# Patient Record
Sex: MALE | Race: BLACK OR AFRICAN AMERICAN | NOT HISPANIC OR LATINO | Employment: UNEMPLOYED | URBAN - NONMETROPOLITAN AREA
[De-identification: names, ages, dates, MRNs, and addresses within clinical notes are randomized per-mention and may not be internally consistent; named-entity substitution may affect disease eponyms.]

---

## 2020-06-22 ENCOUNTER — HISTORICAL (OUTPATIENT)
Dept: ADMINISTRATIVE | Facility: HOSPITAL | Age: 35
End: 2020-06-22

## 2020-06-22 LAB
ALBUMIN SERPL BCP-MCNC: 4.1 G/DL (ref 3.5–5)
ALBUMIN/GLOB SERPL ELPH: 1.1 {RATIO} (ref 1.5–2.2)
ALCOHOL (ETHANOL), BLOOD: <3 MG/DL (ref 0–3)
ALP SERPL-CCNC: 47 U/L (ref 50–136)
ALT SERPL W P-5'-P-CCNC: 26 U/L (ref 16–61)
ANION GAP SERPL CALC-SCNC: 7.8 MEQ/L (ref 10–20)
APPEARANCE, UA: CLEAR
AST SERPL-CCNC: 17 U/L (ref 15–37)
BACTERIA SPEC CULT: NEGATIVE /HPF
BASOPHILS NFR BLD: 0 10 (ref 0–0.1)
BASOPHILS NFR BLD: 0.9 % (ref 0–1.5)
BILIRUB SERPL-MCNC: 0.61 MG/DL (ref 0.2–1)
BILIRUB UR QL STRIP: NEGATIVE MG/DL
BUDDING YEAST: NORMAL /HPF
BUN SERPL-MCNC: 10 MG/DL (ref 7–18)
CALCIUM SERPL-MCNC: 9.2 MG/DL (ref 8.5–10.1)
CASTS, URINE MICROSCOPIC: NEGATIVE /LPF
CHLORIDE SERPL-SCNC: 108 MMOL/L (ref 98–107)
CO2 SERPL-SCNC: 25 MMOL/L (ref 22–32)
COLOR UR: YELLOW
COVID-19 INTERNAL CONTROL: NORMAL
CREAT SERPL-MCNC: 0.97 MG/DL (ref 0.7–1.3)
EGFR: 115 ML/MIN/1.73M
EOSINOPHIL NFR BLD: 0 10 (ref 0–0.7)
EOSINOPHIL NFR BLD: 0.9 % (ref 0–7)
EPITHELIAL, URINE MICROSCOPIC: NEGATIVE /HPF
ERYTHROCYTE [DISTWIDTH] IN BLOOD BY AUTOMATED COUNT: 12.3 % (ref 11.5–14.5)
GLOBULIN: 3.8 G/DL (ref 2.3–3.5)
GLUCOSE (UA): NEGATIVE MG/DL
GLUCOSE SERPL-MCNC: 85 MG/DL (ref 70–99)
GRAN #: 2.63 10 (ref 2–7.5)
GRAN%: 0.2 %
GRAN%: 58.9 % (ref 50–80)
HCT VFR BLD AUTO: 41.5 % (ref 43.5–53.7)
HGB BLD-MCNC: 14.9 G/DL (ref 14.1–18.1)
HGB UR QL STRIP: NEGATIVE ERY/UL
IMMATURE GRANULOCYTES #: 0.01 10
KETONES UR QL STRIP: NEGATIVE MG/DL
LEUKOCYTE ESTERASE UR QL STRIP: NEGATIVE LEU/UL
LYMPH #: 1.4 10 (ref 1–3.5)
LYMPH%: 30.2 % (ref 12–50)
MCH RBC QN AUTO: 30.5 PG (ref 27–31)
MCHC RBC AUTO-ENTMCNC: 35.9 G% (ref 32–35)
MCV RBC AUTO: 85 FL (ref 80–97)
MONO #: 0.4 10 (ref 0–0.8)
MONO%: 8.9 % (ref 0–12)
NITRITE UR QL STRIP: NEGATIVE MG/DL
OSMOC: 272 MOSM/KG (ref 275–295)
PH UR STRIP: 6.5 [PH] (ref 5–7.5)
PMV BLD AUTO: 332 10 (ref 142–424)
PMV BLD AUTO: 9.3 FL (ref 7.4–10.4)
POTASSIUM SERPL-SCNC: 3.8 MMOL/L (ref 3.5–5.1)
PROT SERPL-MCNC: 7.9 G/DL (ref 6.4–8.2)
PROT UR QL STRIP: NEGATIVE MG/DL
RBC # BLD AUTO: 4.88 M/UL (ref 4.69–6.13)
RBC #/AREA URNS HPF: NEGATIVE /HPF
SARS-COV-2 RNA RESP QL NAA+PROBE: NOT DETECTED
SODIUM BLD-SCNC: 137 MMOL/L (ref 136–145)
SP GR UR STRIP: 1.01 (ref 1–1.03)
SPERM, URINE MICROSCOPIC: NORMAL /HPF
TYPE OF SPECIMEN  (UA): NORMAL
UNCLASSIFIED CRYSTALS, UA: NORMAL /HPF
UROBILINOGEN UR STRIP-ACNC: NORMAL EU/L
WBC # BLD AUTO: 4.5 10 (ref 4–10.2)
WBC #/AREA URNS HPF: NEGATIVE /HPF

## 2020-06-23 LAB
CHOLEST SERPL-MSCNC: 193 MG/DL (ref 0–200)
CHOLEST/HDLC SERPL: 4.7 {RATIO}
HDL/CHOLESTEROL RATIO: 41 MG/DL (ref 40–60)
LDLC SERPL CALC-MCNC: 136 MG/DL (ref 0–130)
RPR: NON REACTIVE
TRIGL SERPL-MCNC: 82 MG/ML (ref 30–150)
TSH SERPL DL<=0.005 MIU/L-ACNC: 1.88 UIU/ML (ref 0.36–3.74)

## 2020-11-04 ENCOUNTER — HOSPITAL ENCOUNTER (EMERGENCY)
Facility: HOSPITAL | Age: 35
Discharge: LEFT BEFORE DISCHARGE | End: 2020-11-04
Attending: EMERGENCY MEDICINE
Payer: MEDICAID

## 2020-11-04 VITALS
TEMPERATURE: 98 F | HEART RATE: 101 BPM | HEIGHT: 70 IN | DIASTOLIC BLOOD PRESSURE: 84 MMHG | WEIGHT: 226 LBS | OXYGEN SATURATION: 96 % | SYSTOLIC BLOOD PRESSURE: 138 MMHG | RESPIRATION RATE: 16 BRPM | BODY MASS INDEX: 32.35 KG/M2

## 2020-11-04 DIAGNOSIS — R10.9 ABDOMINAL PAIN: ICD-10-CM

## 2020-11-04 DIAGNOSIS — R10.33 PERIUMBILICAL ABDOMINAL PAIN: Primary | ICD-10-CM

## 2020-11-04 PROCEDURE — 99281 EMR DPT VST MAYX REQ PHY/QHP: CPT

## 2020-11-04 RX ORDER — OLANZAPINE 5 MG/1
20 TABLET ORAL ONCE
Status: DISCONTINUED | OUTPATIENT
Start: 2020-11-04 | End: 2020-11-05 | Stop reason: HOSPADM

## 2020-11-05 NOTE — ED NOTES
"NEUROLOGICAL:   Patient is awake , alert  and oriented x 4 . Pupils are PERRL. Gait is steady.   Moves all extremities without difficulty.   Patient reports no neuro complaints..  GCS 15    HEENT:   Head appears normocephalic  and symmetric .   Eyes appear WNL to both eyes. Patient reports no complaints  to both eyes .   Ears appear WNL. Patient reports no complaints  to both ears.   Nares appear patent . Patient reports no nose complaints .  Mouth appears moist, pink and teeth intact. Patient reports no mouth complaints.   Throat appears pink and moist . Patient reports no throat complaints.    CARDIOVASCULAR:   S1 and S2 present, no murmurs, gallops, or rubs, rate regular  and pulses palpable (2+)    On palpation no edema noted , noted to none.   Patient reports no CV complaints.  .   Patient vitals are WNL.    RESPIRATORY:   Airway Clear, Open, and Patent.  Respirations are even and unlabored.   Breath sounds clear  to all lung fields.   Patient reports no respiratory complaints.     GASTROINTESTINAL:   Abdomen is soft  and non-tender x 4 quadrants. Bowel sounds are normoactive to all quadrants .   Patient reports umbilical pain and rectal pain "when having a BM."     GENITOURINARY:   Patient reports no  complaints.     MUSCULOSKELETAL:   full range of motion to all extremities, no swelling noted , no tenderness noted and no weakness noted.   Patient reports no musculoskeletal complaints     SKIN:   Skin appears warm , dry , good turgor, color normal for race and intact. Patient reports no skin complaints.     "

## 2020-11-05 NOTE — ED NOTES
"Pt tearful and asking "Why didn't anyone tell me that those drugs were going to harm my body?" Pt still refusing films and medications.  "
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
## 2020-11-05 NOTE — ED NOTES
"Pt refuses X-Ray stating "I cant have an Xray with my pacemaker." Explained to patient that X-rays will not harm him or pacemaker. He states "No. I goggled it. I don't want no medicines and I cant take no xray. Y'all need to figure out what is wrong with me."   "

## 2020-12-26 ENCOUNTER — HOSPITAL ENCOUNTER (EMERGENCY)
Facility: HOSPITAL | Age: 35
Discharge: HOME | End: 2020-12-26
Attending: EMERGENCY MEDICINE
Payer: MEDICAID

## 2020-12-26 VITALS
TEMPERATURE: 99 F | RESPIRATION RATE: 18 BRPM | WEIGHT: 234 LBS | HEIGHT: 72 IN | HEART RATE: 72 BPM | OXYGEN SATURATION: 99 % | DIASTOLIC BLOOD PRESSURE: 70 MMHG | BODY MASS INDEX: 31.69 KG/M2 | SYSTOLIC BLOOD PRESSURE: 132 MMHG

## 2020-12-26 DIAGNOSIS — F19.10 DRUG ABUSE: Primary | ICD-10-CM

## 2020-12-26 DIAGNOSIS — F12.90 CANNABINOID HYPEREMESIS SYNDROME: ICD-10-CM

## 2020-12-26 DIAGNOSIS — Z76.5 MALINGERING: ICD-10-CM

## 2020-12-26 DIAGNOSIS — R11.2 CANNABINOID HYPEREMESIS SYNDROME: ICD-10-CM

## 2020-12-26 LAB
ALBUMIN SERPL BCP-MCNC: 3.7 G/DL (ref 3.5–5.2)
ALP SERPL-CCNC: 48 U/L (ref 55–135)
ALT SERPL W/O P-5'-P-CCNC: 55 U/L (ref 10–44)
AMPHET+METHAMPHET UR QL: NORMAL
ANION GAP SERPL CALC-SCNC: 5 MMOL/L (ref 8–16)
AST SERPL-CCNC: 20 U/L (ref 10–40)
BACTERIA #/AREA URNS HPF: NEGATIVE /HPF
BARBITURATES UR QL SCN>200 NG/ML: NEGATIVE
BASOPHILS # BLD AUTO: 0.06 K/UL (ref 0–0.2)
BASOPHILS NFR BLD: 1 % (ref 0–1.9)
BENZODIAZ UR QL SCN>200 NG/ML: NEGATIVE
BILIRUB SERPL-MCNC: 0.4 MG/DL (ref 0.1–1)
BILIRUB UR QL STRIP: NEGATIVE
BUN SERPL-MCNC: 11 MG/DL (ref 6–20)
BZE UR QL SCN: NEGATIVE
CALCIUM SERPL-MCNC: 9.2 MG/DL (ref 8.7–10.5)
CANNABINOIDS UR QL SCN: NORMAL
CHLORIDE SERPL-SCNC: 108 MMOL/L (ref 95–110)
CLARITY UR: CLEAR
CO2 SERPL-SCNC: 29 MMOL/L (ref 23–29)
COLOR UR: YELLOW
CREAT SERPL-MCNC: 1.1 MG/DL (ref 0.5–1.4)
CREAT UR-MCNC: 279 MG/DL (ref 23–375)
DIFFERENTIAL METHOD: ABNORMAL
EOSINOPHIL # BLD AUTO: 0.1 K/UL (ref 0–0.5)
EOSINOPHIL NFR BLD: 2.4 % (ref 0–8)
ERYTHROCYTE [DISTWIDTH] IN BLOOD BY AUTOMATED COUNT: 11.7 % (ref 11.5–14.5)
EST. GFR  (AFRICAN AMERICAN): >60 ML/MIN/1.73 M^2
EST. GFR  (NON AFRICAN AMERICAN): >60 ML/MIN/1.73 M^2
ETHANOL SERPL-MCNC: <3 MG/DL
GLUCOSE SERPL-MCNC: 96 MG/DL (ref 70–110)
GLUCOSE UR QL STRIP: NEGATIVE
HCT VFR BLD AUTO: 41 % (ref 40–54)
HGB BLD-MCNC: 13.9 G/DL (ref 14–18)
HGB UR QL STRIP: NEGATIVE
HYALINE CASTS #/AREA URNS LPF: 2 /LPF
IMM GRANULOCYTES # BLD AUTO: 0.01 K/UL (ref 0–0.04)
IMM GRANULOCYTES NFR BLD AUTO: 0.2 % (ref 0–0.5)
KETONES UR QL STRIP: ABNORMAL
LEUKOCYTE ESTERASE UR QL STRIP: ABNORMAL
LIPASE SERPL-CCNC: 172 U/L (ref 23–300)
LYMPHOCYTES # BLD AUTO: 2.3 K/UL (ref 1–4.8)
LYMPHOCYTES NFR BLD: 37.8 % (ref 18–48)
MCH RBC QN AUTO: 29.1 PG (ref 27–31)
MCHC RBC AUTO-ENTMCNC: 33.9 G/DL (ref 32–36)
MCV RBC AUTO: 86 FL (ref 82–98)
METHADONE UR QL SCN>300 NG/ML: NEGATIVE
MICROSCOPIC COMMENT: ABNORMAL
MONOCYTES # BLD AUTO: 0.4 K/UL (ref 0.3–1)
MONOCYTES NFR BLD: 6.9 % (ref 4–15)
NEUTROPHILS # BLD AUTO: 3.1 K/UL (ref 1.8–7.7)
NEUTROPHILS NFR BLD: 51.7 % (ref 38–73)
NITRITE UR QL STRIP: NEGATIVE
NRBC BLD-RTO: 0 /100 WBC
OPIATES UR QL SCN: NEGATIVE
PCP UR QL SCN>25 NG/ML: NEGATIVE
PH UR STRIP: 6 [PH] (ref 5–8)
PLATELET # BLD AUTO: 330 K/UL (ref 150–350)
PMV BLD AUTO: 9.1 FL (ref 9.2–12.9)
POTASSIUM SERPL-SCNC: 3.6 MMOL/L (ref 3.5–5.1)
PROT SERPL-MCNC: 7.1 G/DL (ref 6–8.4)
PROT UR QL STRIP: NEGATIVE
RBC # BLD AUTO: 4.78 M/UL (ref 4.6–6.2)
RBC #/AREA URNS HPF: 3 /HPF (ref 0–4)
SODIUM SERPL-SCNC: 142 MMOL/L (ref 136–145)
SP GR UR STRIP: 1.02 (ref 1–1.03)
SQUAMOUS #/AREA URNS HPF: 1 /HPF
TOXICOLOGY INFORMATION: NORMAL
TSH SERPL DL<=0.005 MIU/L-ACNC: 1.39 UIU/ML (ref 0.4–4)
URN SPEC COLLECT METH UR: ABNORMAL
UROBILINOGEN UR STRIP-ACNC: 1 EU/DL
WBC # BLD AUTO: 5.95 K/UL (ref 3.9–12.7)
WBC #/AREA URNS HPF: 2 /HPF (ref 0–5)

## 2020-12-26 PROCEDURE — 63600175 PHARM REV CODE 636 W HCPCS: Performed by: EMERGENCY MEDICINE

## 2020-12-26 PROCEDURE — 80053 COMPREHEN METABOLIC PANEL: CPT

## 2020-12-26 PROCEDURE — 96375 TX/PRO/DX INJ NEW DRUG ADDON: CPT

## 2020-12-26 PROCEDURE — 25000003 PHARM REV CODE 250: Performed by: EMERGENCY MEDICINE

## 2020-12-26 PROCEDURE — 99285 EMERGENCY DEPT VISIT HI MDM: CPT | Mod: 25

## 2020-12-26 PROCEDURE — 36415 COLL VENOUS BLD VENIPUNCTURE: CPT

## 2020-12-26 PROCEDURE — 84443 ASSAY THYROID STIM HORMONE: CPT

## 2020-12-26 PROCEDURE — 85025 COMPLETE CBC W/AUTO DIFF WBC: CPT

## 2020-12-26 PROCEDURE — 81000 URINALYSIS NONAUTO W/SCOPE: CPT | Mod: 59

## 2020-12-26 PROCEDURE — 80320 DRUG SCREEN QUANTALCOHOLS: CPT

## 2020-12-26 PROCEDURE — 86677 HELICOBACTER PYLORI ANTIBODY: CPT

## 2020-12-26 PROCEDURE — 80307 DRUG TEST PRSMV CHEM ANLYZR: CPT

## 2020-12-26 PROCEDURE — 96374 THER/PROPH/DIAG INJ IV PUSH: CPT

## 2020-12-26 PROCEDURE — 83690 ASSAY OF LIPASE: CPT

## 2020-12-26 PROCEDURE — 96361 HYDRATE IV INFUSION ADD-ON: CPT

## 2020-12-26 RX ORDER — LIDOCAINE HYDROCHLORIDE 20 MG/ML
10 SOLUTION OROPHARYNGEAL
Status: COMPLETED | OUTPATIENT
Start: 2020-12-26 | End: 2020-12-26

## 2020-12-26 RX ORDER — MAG HYDROX/ALUMINUM HYD/SIMETH 200-200-20
30 SUSPENSION, ORAL (FINAL DOSE FORM) ORAL
Status: COMPLETED | OUTPATIENT
Start: 2020-12-26 | End: 2020-12-26

## 2020-12-26 RX ORDER — FAMOTIDINE 10 MG/ML
20 INJECTION INTRAVENOUS
Status: COMPLETED | OUTPATIENT
Start: 2020-12-26 | End: 2020-12-26

## 2020-12-26 RX ORDER — ONDANSETRON 2 MG/ML
4 INJECTION INTRAMUSCULAR; INTRAVENOUS
Status: COMPLETED | OUTPATIENT
Start: 2020-12-26 | End: 2020-12-26

## 2020-12-26 RX ORDER — PROMETHAZINE HYDROCHLORIDE 25 MG/1
25 TABLET ORAL EVERY 4 HOURS PRN
Qty: 20 TABLET | Refills: 0 | Status: SHIPPED | OUTPATIENT
Start: 2020-12-26

## 2020-12-26 RX ADMIN — ONDANSETRON 4 MG: 2 INJECTION INTRAMUSCULAR; INTRAVENOUS at 06:12

## 2020-12-26 RX ADMIN — SODIUM CHLORIDE 1000 ML: 0.9 INJECTION, SOLUTION INTRAVENOUS at 06:12

## 2020-12-26 RX ADMIN — LIDOCAINE HYDROCHLORIDE 10 ML: 20 SOLUTION ORAL; TOPICAL at 06:12

## 2020-12-26 RX ADMIN — ALUMINUM HYDROXIDE, MAGNESIUM HYDROXIDE, AND SIMETHICONE 30 ML: 200; 200; 20 SUSPENSION ORAL at 06:12

## 2020-12-26 RX ADMIN — FAMOTIDINE 20 MG: 10 INJECTION INTRAVENOUS at 06:12

## 2020-12-27 ENCOUNTER — HOSPITAL ENCOUNTER (EMERGENCY)
Facility: HOSPITAL | Age: 35
Discharge: HOME | End: 2020-12-27
Attending: EMERGENCY MEDICINE
Payer: MEDICAID

## 2020-12-27 VITALS
OXYGEN SATURATION: 97 % | HEART RATE: 87 BPM | BODY MASS INDEX: 31.83 KG/M2 | HEIGHT: 72 IN | TEMPERATURE: 99 F | WEIGHT: 235 LBS | SYSTOLIC BLOOD PRESSURE: 140 MMHG | RESPIRATION RATE: 16 BRPM | DIASTOLIC BLOOD PRESSURE: 65 MMHG

## 2020-12-27 DIAGNOSIS — R45.1 AGITATION: Primary | ICD-10-CM

## 2020-12-27 PROCEDURE — 99283 EMERGENCY DEPT VISIT LOW MDM: CPT

## 2020-12-27 PROCEDURE — 25000003 PHARM REV CODE 250: Performed by: EMERGENCY MEDICINE

## 2020-12-27 RX ORDER — OLANZAPINE 5 MG/1
5 TABLET ORAL NIGHTLY
Qty: 14 TABLET | Refills: 0 | Status: SHIPPED | OUTPATIENT
Start: 2020-12-27 | End: 2021-01-10

## 2020-12-27 RX ORDER — OLANZAPINE 10 MG/1
10 TABLET, ORALLY DISINTEGRATING ORAL ONCE
Status: COMPLETED | OUTPATIENT
Start: 2020-12-27 | End: 2020-12-27

## 2020-12-27 RX ADMIN — OLANZAPINE 10 MG: 10 TABLET, ORALLY DISINTEGRATING ORAL at 02:12

## 2020-12-27 NOTE — ED NOTES
PATIENT WAS PLEASANT WHEN DISCHARGING HIM. PATIENT CALLED FOR A RIDE AND TREATMENT DISCUSSED FOR FUTURE.

## 2020-12-27 NOTE — ED NOTES
Pt wanting treatment for abdominal issues, states wants to go to 7th floor after for drug problems.  Pt states uses meth, last use 3-4 days ago.  Dr Michaels notified.

## 2020-12-27 NOTE — ED NOTES
NEUROLOGICAL:   Patient is awake , alert  and oriented x 4 . Pupils are PERRL. Gait is steady.   Moves all extremities without difficulty.   Patient reports no neuro complaints..  GCS 15    HEENT:   Head appears normocephalic  and symmetric .   Eyes appear WNL to both eye(s). Patient reports no complaints  to both eye(s) .   Ears appear WNL. Patient reports no complaints  to both ear(s).   Nares appear patent . Patient reports no nose complaints .  Mouth appears moist, pink and teeth intact. Patient reports no mouth complaints.   Throat appears pink and moist . Patient reports no throat complaints.    CARDIOVASCULAR:   S1 and S2 present, no murmurs, gallops, or rubs, rate regular  and pulses palpable (2+)    On palpation no edema noted , noted to none.   Patient reports no CV complaints.  .   Patient vitals are WNL.    RESPIRATORY:   Airway Clear, Open, and Patent.  Respirations are even and unlabored.   Breath sounds clear  to all lung fields.   Patient reports no respiratory complaints.     GASTROINTESTINAL:   Abdomen is soft  and non-tender x 4 quadrants. Bowel sounds are normoactive to all quadrants .   Patient reports no GI complaints .     GENITOURINARY:   Patient reports no  complaints.     MUSCULOSKELETAL:   full range of motion to all extremities, no swelling noted , no tenderness noted and no weakness noted.   Patient reports no musculoskeletal complaints     SKIN:   Skin appears warm , moist, good turgor, color normal for race and intact. Patient reports no skin complaints.     Patient is pacing the room two security gaurds at bedside.

## 2020-12-27 NOTE — ED PROVIDER NOTES
"Encounter Date: 12/26/2020       History     Chief Complaint   Patient presents with    GI Problem     patient repeats "my stomach is killing me, everywhere I go everyone says nothing is wrong with me and its driving me crazy" patient tearful and agitated in triage     The patient is a 35-year-old male, and that presented to the ER for evaluation because of abdominal pain.  The patient arrived to the emergency department before my shift started, but I was told that he was rude and agitated with staff.  He stated that he had been having abdominal pain for years, but that no one had ever been able to figure out exactly what was wrong with the stomach.  During my initial evaluation the patient was very calm and admitted that he had been experiencing intermittent episodes of crampy migratory abdominal pain several years.  He actually admitted that often times he abdominal pain which started after he smoked something.  Patient actively denied any significant nausea, chest pain, shortness of breath, cough, fever, chills.  I was also told by staff that the patient does have a psychiatric history, but there are my initial evaluation he denied any auditory hallucinations, visual hallucinations, suicidal ideation, or homicidal ideation.        Review of patient's allergies indicates:  No Known Allergies  Past Medical History:   Diagnosis Date    Epididymitis      History reviewed. No pertinent surgical history.  History reviewed. No pertinent family history.  Social History     Tobacco Use    Smoking status: Current Every Day Smoker     Packs/day: 1.00     Types: Cigarettes   Substance Use Topics    Alcohol use: Yes     Comment: occassional    Drug use: Yes     Types: Marijuana, Methamphetamines     Review of Systems   Constitutional: Negative for fever.   HENT: Negative for sore throat.    Respiratory: Negative for shortness of breath.    Cardiovascular: Negative for chest pain.   Gastrointestinal: Negative for " nausea.   Genitourinary: Negative for dysuria.   Musculoskeletal: Negative for back pain.   Skin: Negative for rash.   Neurological: Negative for weakness.   Hematological: Does not bruise/bleed easily.       Physical Exam     Initial Vitals [12/26/20 1735]   BP Pulse Resp Temp SpO2   132/70 72 18 98.5 °F (36.9 °C) 99 %      MAP       --         Physical Exam    Nursing note and vitals reviewed.  Constitutional: He appears well-developed and well-nourished.   HENT:   Head: Normocephalic and atraumatic.   Eyes: Conjunctivae and EOM are normal. Pupils are equal, round, and reactive to light.   Neck: Normal range of motion. Neck supple. No tracheal deviation present.   Cardiovascular: Normal rate, regular rhythm, normal heart sounds and intact distal pulses.   Pulmonary/Chest: Breath sounds normal. No respiratory distress. He has no wheezes. He has no rhonchi. He has no rales. He exhibits no tenderness.   Abdominal: Soft. Bowel sounds are normal. He exhibits no distension and no mass. There is no abdominal tenderness. There is no rebound and no guarding.   When distracted there is no focal abdominal tenderness to palpation   Musculoskeletal: Normal range of motion. No tenderness or edema.   Neurological: He is alert and oriented to person, place, and time. He has normal strength and normal reflexes. He displays normal reflexes. No cranial nerve deficit or sensory deficit.   Skin: Skin is warm and dry. Capillary refill takes less than 2 seconds.   Psychiatric: He has a normal mood and affect. His behavior is normal. Judgment and thought content normal.         ED Course   Procedures  Labs Reviewed   CBC W/ AUTO DIFFERENTIAL - Abnormal; Notable for the following components:       Result Value    Hemoglobin 13.9 (*)     MPV 9.1 (*)     All other components within normal limits   COMPREHENSIVE METABOLIC PANEL - Abnormal; Notable for the following components:    Alkaline Phosphatase 48 (*)     ALT 55 (*)     Anion Gap 5 (*)      All other components within normal limits   URINALYSIS, REFLEX TO URINE CULTURE - Abnormal; Notable for the following components:    Ketones, UA Trace (*)     Leukocytes, UA Trace (*)     All other components within normal limits    Narrative:     Specimen Source->Urine   URINALYSIS MICROSCOPIC - Abnormal; Notable for the following components:    Hyaline Casts, UA 2 (*)     All other components within normal limits    Narrative:     Specimen Source->Urine   LIPASE   DRUG SCREEN PANEL, URINE EMERGENCY    Narrative:     Specimen Source->Urine   ALCOHOL,MEDICAL (ETHANOL)   TSH   H. PYLORI ANTIBODY, IGG          Imaging Results          CT Abdomen Pelvis  Without Contrast (In process)                  Medical Decision Making:   ED Management:  Patient's labs are grossly within normal limits, but because of the patient's complaint of abdominal discomfort and no obvious signs of imaging studies in our computer system undecided to perform a CT scan.  The patient's CT scan was negative for any acute intra-abdominal pathology.  Clear was discussing the CT findings with the patient and explained that his CT scan was normal, but that we would be able to start him on medicines to help with his stomach discomfort he stated that he wanted to go to the 7th floor, for psychiatric evaluation.  The patient had initially denied any suicidal or homicidal ideation, and when I told him that he would not be admitted to the 7th floor, because he felt like he needed to talk to somebody about his drug addiction.  I specifically told the patient I did not feel that his long history of drug abuse warranted a psychiatric admission, especially because initially he denied any increased depression, suicidal ideation, or homicidal ideation when we were evaluating his abdominal pain.  Ali the patient is malingering and does not have a current place to stay.  At this point in time the patient was discharged ultimately escorted off the property,  with outpatient follow-up information for drug abuse.                             Clinical Impression:       ICD-10-CM ICD-9-CM   1. Drug abuse  F19.10 305.90   2. Cannabinoid hyperemesis syndrome  R11.2 536.2    F12.90 305.20   3. Malingering  Z76.5 V65.2                          ED Disposition Condition    Discharge Stable        ED Prescriptions     Medication Sig Dispense Start Date End Date Auth. Provider    promethazine (PHENERGAN) 25 MG tablet Take 1 tablet (25 mg total) by mouth every 4 (four) hours as needed for Nausea. 20 tablet 12/26/2020  Tyrell Michaels MD        Follow-up Information    None                                      Tyrell Michaels MD  12/27/20 7450
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
## 2020-12-27 NOTE — ED NOTES
Upon discharging pt and taking out IV, pt began refusing to leave, asking to speak to the physician to tell him that he fears for his life d/t self harm and his family is trying to kill him. Patient originally here for c/o of stomach issues. Only began c/o of self harm and other paranoia when being discharged.  MD Michaels notified. Patient started to become angry, threatening, and irate. Security at bedside trying to calm pt down. LUIS called to escort pt out of ER.

## 2020-12-27 NOTE — ED NOTES
DR. FAM STATES PATIENT DOES WILL NOT BE PEC'D ONCE PATIENT IS ABLE TO FIND A RIDE PATIENT WILL BE DISCHARGED.

## 2020-12-29 LAB — H PYLORI IGG SERPL QL IA: NEGATIVE

## 2020-12-31 ENCOUNTER — HOSPITAL ENCOUNTER (EMERGENCY)
Facility: HOSPITAL | Age: 35
Discharge: HOME | End: 2020-12-31
Attending: EMERGENCY MEDICINE
Payer: MEDICAID

## 2020-12-31 VITALS
RESPIRATION RATE: 16 BRPM | TEMPERATURE: 99 F | DIASTOLIC BLOOD PRESSURE: 64 MMHG | SYSTOLIC BLOOD PRESSURE: 112 MMHG | OXYGEN SATURATION: 100 % | HEART RATE: 78 BPM

## 2020-12-31 DIAGNOSIS — R45.1 AGITATION: Primary | ICD-10-CM

## 2020-12-31 PROCEDURE — 96372 THER/PROPH/DIAG INJ SC/IM: CPT

## 2020-12-31 PROCEDURE — 63600175 PHARM REV CODE 636 W HCPCS: Performed by: EMERGENCY MEDICINE

## 2020-12-31 PROCEDURE — 99284 EMERGENCY DEPT VISIT MOD MDM: CPT | Mod: 25

## 2020-12-31 RX ORDER — DIPHENHYDRAMINE HYDROCHLORIDE 50 MG/ML
50 INJECTION INTRAMUSCULAR; INTRAVENOUS
Status: COMPLETED | OUTPATIENT
Start: 2020-12-31 | End: 2020-12-31

## 2020-12-31 RX ORDER — LORAZEPAM 2 MG/ML
1 INJECTION INTRAMUSCULAR
Status: COMPLETED | OUTPATIENT
Start: 2020-12-31 | End: 2020-12-31

## 2020-12-31 RX ORDER — HALOPERIDOL 5 MG/ML
10 INJECTION INTRAMUSCULAR
Status: COMPLETED | OUTPATIENT
Start: 2020-12-31 | End: 2020-12-31

## 2020-12-31 RX ORDER — OLANZAPINE 10 MG/1
20 TABLET, ORALLY DISINTEGRATING ORAL ONCE
Status: DISCONTINUED | OUTPATIENT
Start: 2020-12-31 | End: 2020-12-31

## 2020-12-31 RX ORDER — OLANZAPINE 10 MG/1
20 TABLET, ORALLY DISINTEGRATING ORAL NIGHTLY
Status: DISCONTINUED | OUTPATIENT
Start: 2020-12-31 | End: 2020-12-31

## 2020-12-31 RX ADMIN — DIPHENHYDRAMINE HYDROCHLORIDE 50 MG: 50 INJECTION INTRAMUSCULAR; INTRAVENOUS at 09:12

## 2020-12-31 RX ADMIN — HALOPERIDOL LACTATE 10 MG: 5 INJECTION, SOLUTION INTRAMUSCULAR at 09:12

## 2020-12-31 RX ADMIN — LORAZEPAM 2 MG: 2 INJECTION INTRAMUSCULAR; INTRAVENOUS at 09:12
